# Patient Record
Sex: FEMALE | Race: ASIAN | Employment: UNEMPLOYED
[De-identification: names, ages, dates, MRNs, and addresses within clinical notes are randomized per-mention and may not be internally consistent; named-entity substitution may affect disease eponyms.]

---

## 2024-09-24 LAB
HEP B, EXTERNAL RESULT: NEGATIVE
HEPATITIS C ANTIBODY, EXTERNAL RESULT: NONREACTIVE
HIV, EXTERNAL RESULT: NONREACTIVE
RUBELLA TITER, EXTERNAL RESULT: NORMAL
T. PALLIDUM (SYPHILIS) ANTIBODY, EXTERNAL RESULT: NONREACTIVE

## 2024-09-26 LAB
C. TRACHOMATIS, EXTERNAL RESULT: NEGATIVE
N. GONORRHOEAE, EXTERNAL RESULT: NEGATIVE

## 2024-11-17 ENCOUNTER — APPOINTMENT (OUTPATIENT)
Facility: HOSPITAL | Age: 29
End: 2024-11-17
Payer: COMMERCIAL

## 2024-11-17 ENCOUNTER — HOSPITAL ENCOUNTER (EMERGENCY)
Facility: HOSPITAL | Age: 29
Discharge: HOME | End: 2024-11-17
Attending: STUDENT IN AN ORGANIZED HEALTH CARE EDUCATION/TRAINING PROGRAM
Payer: COMMERCIAL

## 2024-11-17 VITALS
RESPIRATION RATE: 20 BRPM | WEIGHT: 123.68 LBS | HEART RATE: 98 BPM | SYSTOLIC BLOOD PRESSURE: 120 MMHG | OXYGEN SATURATION: 97 % | BODY MASS INDEX: 24.28 KG/M2 | DIASTOLIC BLOOD PRESSURE: 86 MMHG | HEIGHT: 60 IN | TEMPERATURE: 97.6 F

## 2024-11-17 DIAGNOSIS — N93.9 VAGINAL BLEEDING: Primary | ICD-10-CM

## 2024-11-17 DIAGNOSIS — D72.829 LEUKOCYTOSIS, UNSPECIFIED TYPE: ICD-10-CM

## 2024-11-17 DIAGNOSIS — Z3A.14 14 WEEKS GESTATION OF PREGNANCY: ICD-10-CM

## 2024-11-17 LAB
ABDOMINAL CIRCUMFERENCE: 7.74 CM
ABDOMINAL CIRCUMFERENCE: 7.79 CM
ABDOMINAL CIRCUMFERENCE: 7.82 CM
ABDOMINAL CIRCUMFERENCE: 7.82 CM
ABO + RH BLD: NORMAL
ALBUMIN SERPL-MCNC: 3.2 G/DL (ref 3.5–5)
ALBUMIN/GLOB SERPL: 0.7 (ref 1.1–2.2)
ALP SERPL-CCNC: 101 U/L (ref 45–117)
ALT SERPL-CCNC: 51 U/L (ref 12–78)
ANION GAP SERPL CALC-SCNC: 3 MMOL/L (ref 2–12)
APPEARANCE UR: CLEAR
AST SERPL-CCNC: 24 U/L (ref 15–37)
BACTERIA URNS QL MICRO: NEGATIVE /HPF
BASOPHILS # BLD: 0 K/UL (ref 0–0.1)
BASOPHILS NFR BLD: 0 % (ref 0–1)
BILIRUB SERPL-MCNC: 0.2 MG/DL (ref 0.2–1)
BILIRUB UR QL: NEGATIVE
BIPARIETAL DIAMETER: 2.75 CM
BIPARIETAL DIAMETER: 2.81 CM
BIPARIETAL DIAMETER: 2.81 CM
BIPARIETAL DIAMETER: 2.84 CM
BIPARIETAL DIAMETER: 2.87 CM
BLOOD BANK CMNT PATIENT-IMP: NORMAL
BUN SERPL-MCNC: 6 MG/DL (ref 6–20)
BUN/CREAT SERPL: 12 (ref 12–20)
CALCIUM SERPL-MCNC: 9.3 MG/DL (ref 8.5–10.1)
CHLORIDE SERPL-SCNC: 110 MMOL/L (ref 97–108)
CO2 SERPL-SCNC: 24 MMOL/L (ref 21–32)
COLOR UR: ABNORMAL
COMMENT:: NORMAL
CREAT SERPL-MCNC: 0.51 MG/DL (ref 0.55–1.02)
CRL: 8.56 CM
CRL: 8.91 CM
CRL: 9.02 CM
CRL: 9.15 CM
DIFFERENTIAL METHOD BLD: ABNORMAL
EOSINOPHIL # BLD: 0 K/UL (ref 0–0.4)
EOSINOPHIL NFR BLD: 0 % (ref 0–7)
EPITH CASTS URNS QL MICRO: ABNORMAL /LPF
ERYTHROCYTE [DISTWIDTH] IN BLOOD BY AUTOMATED COUNT: 13.8 % (ref 11.5–14.5)
GLOBULIN SER CALC-MCNC: 4.7 G/DL (ref 2–4)
GLUCOSE SERPL-MCNC: 94 MG/DL (ref 65–100)
GLUCOSE UR STRIP.AUTO-MCNC: NEGATIVE MG/DL
HCG SERPL-ACNC: ABNORMAL MIU/ML (ref 0–6)
HCT VFR BLD AUTO: 35.7 % (ref 35–47)
HEAD CIRCUMFERENCE: 10.03 CM
HEAD CIRCUMFERENCE: 10.24 CM
HEAD CIRCUMFERENCE: 10.38 CM
HEAD CIRCUMFERENCE: 10.87 CM
HGB BLD-MCNC: 12.2 G/DL (ref 11.5–16)
HGB UR QL STRIP: ABNORMAL
HYALINE CASTS URNS QL MICRO: ABNORMAL /LPF (ref 0–5)
IMM GRANULOCYTES # BLD AUTO: 0 K/UL
IMM GRANULOCYTES NFR BLD AUTO: 0 %
KETONES UR QL STRIP.AUTO: NEGATIVE MG/DL
LEUKOCYTE ESTERASE UR QL STRIP.AUTO: NEGATIVE
LYMPHOCYTES # BLD: 2.8 K/UL (ref 0.8–3.5)
LYMPHOCYTES NFR BLD: 15 % (ref 12–49)
MCH RBC QN AUTO: 29.5 PG (ref 26–34)
MCHC RBC AUTO-ENTMCNC: 34.2 G/DL (ref 30–36.5)
MCV RBC AUTO: 86.2 FL (ref 80–99)
MONOCYTES # BLD: 1.5 K/UL (ref 0–1)
MONOCYTES NFR BLD: 8 % (ref 5–13)
NEUTS SEG # BLD: 14.3 K/UL (ref 1.8–8)
NEUTS SEG NFR BLD: 77 % (ref 32–75)
NITRITE UR QL STRIP.AUTO: NEGATIVE
NRBC # BLD: 0 K/UL (ref 0–0.01)
NRBC BLD-RTO: 0 PER 100 WBC
PH UR STRIP: 6.5 (ref 5–8)
PLATELET # BLD AUTO: 331 K/UL (ref 150–400)
PMV BLD AUTO: 10.5 FL (ref 8.9–12.9)
POTASSIUM SERPL-SCNC: 4 MMOL/L (ref 3.5–5.1)
PROT SERPL-MCNC: 7.9 G/DL (ref 6.4–8.2)
PROT UR STRIP-MCNC: NEGATIVE MG/DL
RBC # BLD AUTO: 4.14 M/UL (ref 3.8–5.2)
RBC #/AREA URNS HPF: ABNORMAL /HPF (ref 0–5)
RBC MORPH BLD: ABNORMAL
SODIUM SERPL-SCNC: 137 MMOL/L (ref 136–145)
SP GR UR REFRACTOMETRY: <1.005 (ref 1–1.03)
SPECIMEN HOLD: NORMAL
SPECIMEN HOLD: NORMAL
UROBILINOGEN UR QL STRIP.AUTO: 0.2 EU/DL (ref 0.2–1)
WBC # BLD AUTO: 18.6 K/UL (ref 3.6–11)
WBC URNS QL MICRO: ABNORMAL /HPF (ref 0–4)

## 2024-11-17 PROCEDURE — 85025 COMPLETE CBC W/AUTO DIFF WBC: CPT

## 2024-11-17 PROCEDURE — 80053 COMPREHEN METABOLIC PANEL: CPT

## 2024-11-17 PROCEDURE — 36415 COLL VENOUS BLD VENIPUNCTURE: CPT

## 2024-11-17 PROCEDURE — 81001 URINALYSIS AUTO W/SCOPE: CPT

## 2024-11-17 PROCEDURE — 99284 EMERGENCY DEPT VISIT MOD MDM: CPT

## 2024-11-17 PROCEDURE — 84702 CHORIONIC GONADOTROPIN TEST: CPT

## 2024-11-17 PROCEDURE — 76801 OB US < 14 WKS SINGLE FETUS: CPT

## 2024-11-17 PROCEDURE — 86901 BLOOD TYPING SEROLOGIC RH(D): CPT

## 2024-11-17 PROCEDURE — 86900 BLOOD TYPING SEROLOGIC ABO: CPT

## 2024-11-17 ASSESSMENT — PAIN - FUNCTIONAL ASSESSMENT: PAIN_FUNCTIONAL_ASSESSMENT: NONE - DENIES PAIN

## 2024-11-17 NOTE — ED TRIAGE NOTES
Patient arrives to ED reports vaginal bleeding upon waking up this morning.  Patient reports she is 14 weeks pregnant

## 2024-11-17 NOTE — ED NOTES
MD reviewed discharge instructions and options with patient and patient verbalized understanding. RN reviewed discharge instructions using teachback method. Pt ambulated to exit without difficulty and in no signs of acute distress escorted by male , and he  will drive home. No complaints or needs expressed at this time. Patient was counseled on medications prescribed at discharge. VSS, verbalized relief from most intense pain. Patient to call her OBGYN in the morning for appointment.

## 2024-11-17 NOTE — ED PROVIDER NOTES
CBC without evidence of anemia.  It does note leukocytosis, however discussed with Dr. Leiva, this can be expected in pregnancy.  Patient without any infectious symptoms.  Beta-HCG 52515.  Transvaginal ultrasound notes viable IUP at 14 weeks 4 days gestation.  On reassessment of the patient, she reports that her bleeding has resolved.  She went to the bathroom and had no further spotting.  Patient is stable for discharge home at this time.  Return precautions discussed with patient who expresses understanding and is in agreement with the current plan.  Recommend follow-up with her OB/GYN, as scheduled for tomorrow.    Amount and/or Complexity of Data Reviewed  Labs: ordered.  Radiology: ordered.          FINAL IMPRESSION      1. Vaginal bleeding    2. 14 weeks gestation of pregnancy    3. Leukocytosis, unspecified type          DISPOSITION/PLAN   DISPOSITION Decision To Discharge 11/17/2024 02:41:49 PM      PATIENT REFERRED TO:  Bishnu Jalloh MD  [address] [address] [phone]    In 2 days      [facility] EMERGENCY DEP  [address] [place] [id number] [phone]    As needed, If symptoms worsen    Your OBGYN - as scheduled tomorrow            DISCHARGE MEDICATIONS:  There are no discharge medications for this patient.        (Please note that portions of this note were completed with a voice recognition program.  Efforts were made to edit the dictations but occasionally words are mis-transcribed.)    Juarez Anderson PA-C (electronically signed)  Emergency Attending Physician / Physician Assistant / Nurse Practitioner             Juarez Anderson PA-C  11/17/24 3134

## 2025-04-24 LAB — GBS, EXTERNAL RESULT: NEGATIVE

## 2025-05-08 ENCOUNTER — HOSPITAL ENCOUNTER (INPATIENT)
Facility: HOSPITAL | Age: 30
LOS: 2 days | Discharge: HOME | End: 2025-05-10
Attending: OBSTETRICS & GYNECOLOGY | Admitting: OBSTETRICS & GYNECOLOGY
Payer: COMMERCIAL

## 2025-05-08 ENCOUNTER — ANESTHESIA (OUTPATIENT)
Facility: HOSPITAL | Age: 30
End: 2025-05-08
Payer: COMMERCIAL

## 2025-05-08 ENCOUNTER — ANESTHESIA EVENT (OUTPATIENT)
Facility: HOSPITAL | Age: 30
End: 2025-05-08
Payer: COMMERCIAL

## 2025-05-08 LAB
ABO + RH BLD: NORMAL
BLOOD GROUP ANTIBODIES SERPL: NORMAL
ERYTHROCYTE [DISTWIDTH] IN BLOOD BY AUTOMATED COUNT: 13.7 % (ref 11.5–14.5)
GLUCOSE BLD STRIP.AUTO-MCNC: 95 MG/DL (ref 65–117)
GLUCOSE SERPL-MCNC: 88 MG/DL (ref 65–100)
HCT VFR BLD AUTO: 42.7 % (ref 35–47)
HGB BLD-MCNC: 14.7 G/DL (ref 11.5–16)
MCH RBC QN AUTO: 29.9 PG (ref 26–34)
MCHC RBC AUTO-ENTMCNC: 34.4 G/DL (ref 30–36.5)
MCV RBC AUTO: 87 FL (ref 80–99)
NRBC # BLD: 0 K/UL (ref 0–0.01)
NRBC BLD-RTO: 0 PER 100 WBC
PLATELET # BLD AUTO: 209 K/UL (ref 150–400)
PMV BLD AUTO: 12.8 FL (ref 8.9–12.9)
RBC # BLD AUTO: 4.91 M/UL (ref 3.8–5.2)
RPR SER QL: NONREACTIVE
SERVICE CMNT-IMP: NORMAL
SPECIMEN EXP DATE BLD: NORMAL
WBC # BLD AUTO: 15.2 K/UL (ref 3.6–11)

## 2025-05-08 PROCEDURE — 4A1HXCZ MONITORING OF PRODUCTS OF CONCEPTION, CARDIAC RATE, EXTERNAL APPROACH: ICD-10-PCS | Performed by: STUDENT IN AN ORGANIZED HEALTH CARE EDUCATION/TRAINING PROGRAM

## 2025-05-08 PROCEDURE — 82947 ASSAY GLUCOSE BLOOD QUANT: CPT

## 2025-05-08 PROCEDURE — 86900 BLOOD TYPING SEROLOGIC ABO: CPT

## 2025-05-08 PROCEDURE — 85027 COMPLETE CBC AUTOMATED: CPT

## 2025-05-08 PROCEDURE — 6370000000 HC RX 637 (ALT 250 FOR IP): Performed by: STUDENT IN AN ORGANIZED HEALTH CARE EDUCATION/TRAINING PROGRAM

## 2025-05-08 PROCEDURE — 4500000002 HC ER NO CHARGE

## 2025-05-08 PROCEDURE — 86901 BLOOD TYPING SEROLOGIC RH(D): CPT

## 2025-05-08 PROCEDURE — 6360000002 HC RX W HCPCS: Performed by: STUDENT IN AN ORGANIZED HEALTH CARE EDUCATION/TRAINING PROGRAM

## 2025-05-08 PROCEDURE — 0KQM0ZZ REPAIR PERINEUM MUSCLE, OPEN APPROACH: ICD-10-PCS | Performed by: STUDENT IN AN ORGANIZED HEALTH CARE EDUCATION/TRAINING PROGRAM

## 2025-05-08 PROCEDURE — 99285 EMERGENCY DEPT VISIT HI MDM: CPT

## 2025-05-08 PROCEDURE — 2500000003 HC RX 250 WO HCPCS

## 2025-05-08 PROCEDURE — 00HU33Z INSERTION OF INFUSION DEVICE INTO SPINAL CANAL, PERCUTANEOUS APPROACH: ICD-10-PCS | Performed by: STUDENT IN AN ORGANIZED HEALTH CARE EDUCATION/TRAINING PROGRAM

## 2025-05-08 PROCEDURE — 2500000003 HC RX 250 WO HCPCS: Performed by: STUDENT IN AN ORGANIZED HEALTH CARE EDUCATION/TRAINING PROGRAM

## 2025-05-08 PROCEDURE — 51702 INSERT TEMP BLADDER CATH: CPT

## 2025-05-08 PROCEDURE — 82962 GLUCOSE BLOOD TEST: CPT

## 2025-05-08 PROCEDURE — 3700000025 EPIDURAL BLOCK: Performed by: STUDENT IN AN ORGANIZED HEALTH CARE EDUCATION/TRAINING PROGRAM

## 2025-05-08 PROCEDURE — 86592 SYPHILIS TEST NON-TREP QUAL: CPT

## 2025-05-08 PROCEDURE — 86850 RBC ANTIBODY SCREEN: CPT

## 2025-05-08 PROCEDURE — 6360000002 HC RX W HCPCS: Performed by: MIDWIFE

## 2025-05-08 PROCEDURE — 1120000000 HC RM PRIVATE OB

## 2025-05-08 PROCEDURE — 51701 INSERT BLADDER CATHETER: CPT

## 2025-05-08 PROCEDURE — 2580000003 HC RX 258: Performed by: MIDWIFE

## 2025-05-08 PROCEDURE — 2580000003 HC RX 258: Performed by: STUDENT IN AN ORGANIZED HEALTH CARE EDUCATION/TRAINING PROGRAM

## 2025-05-08 RX ORDER — ACETAMINOPHEN 500 MG
1000 TABLET ORAL EVERY 8 HOURS PRN
Status: DISCONTINUED | OUTPATIENT
Start: 2025-05-08 | End: 2025-05-09 | Stop reason: SDUPTHER

## 2025-05-08 RX ORDER — MISOPROSTOL 200 UG/1
800 TABLET ORAL PRN
Status: DISCONTINUED | OUTPATIENT
Start: 2025-05-08 | End: 2025-05-10 | Stop reason: HOSPADM

## 2025-05-08 RX ORDER — LIDOCAINE HYDROCHLORIDE AND EPINEPHRINE 20; 5 MG/ML; UG/ML
INJECTION, SOLUTION EPIDURAL; INFILTRATION; INTRACAUDAL; PERINEURAL
Status: DISCONTINUED | OUTPATIENT
Start: 2025-05-08 | End: 2025-05-08 | Stop reason: SDUPTHER

## 2025-05-08 RX ORDER — SODIUM CHLORIDE 0.9 % (FLUSH) 0.9 %
5-40 SYRINGE (ML) INJECTION PRN
Status: DISCONTINUED | OUTPATIENT
Start: 2025-05-08 | End: 2025-05-09 | Stop reason: SDUPTHER

## 2025-05-08 RX ORDER — METHYLERGONOVINE MALEATE 0.2 MG/ML
200 INJECTION INTRAVENOUS PRN
Status: DISCONTINUED | OUTPATIENT
Start: 2025-05-08 | End: 2025-05-10 | Stop reason: HOSPADM

## 2025-05-08 RX ORDER — ACETAMINOPHEN 500 MG
1000 TABLET ORAL EVERY 8 HOURS SCHEDULED
Status: DISCONTINUED | OUTPATIENT
Start: 2025-05-08 | End: 2025-05-10 | Stop reason: HOSPADM

## 2025-05-08 RX ORDER — NALOXONE HYDROCHLORIDE 0.4 MG/ML
INJECTION, SOLUTION INTRAMUSCULAR; INTRAVENOUS; SUBCUTANEOUS PRN
Status: DISCONTINUED | OUTPATIENT
Start: 2025-05-08 | End: 2025-05-10 | Stop reason: HOSPADM

## 2025-05-08 RX ORDER — MISOPROSTOL 200 UG/1
400 TABLET ORAL PRN
Status: DISCONTINUED | OUTPATIENT
Start: 2025-05-08 | End: 2025-05-09

## 2025-05-08 RX ORDER — SODIUM CHLORIDE 0.9 % (FLUSH) 0.9 %
5-40 SYRINGE (ML) INJECTION EVERY 12 HOURS SCHEDULED
Status: DISCONTINUED | OUTPATIENT
Start: 2025-05-08 | End: 2025-05-10 | Stop reason: HOSPADM

## 2025-05-08 RX ORDER — BUPIVACAINE HYDROCHLORIDE 2.5 MG/ML
INJECTION, SOLUTION EPIDURAL; INFILTRATION; INTRACAUDAL; PERINEURAL
Status: COMPLETED
Start: 2025-05-08 | End: 2025-05-08

## 2025-05-08 RX ORDER — NALBUPHINE HYDROCHLORIDE 10 MG/ML
5 INJECTION INTRAMUSCULAR; INTRAVENOUS; SUBCUTANEOUS EVERY 4 HOURS PRN
Status: DISCONTINUED | OUTPATIENT
Start: 2025-05-08 | End: 2025-05-10 | Stop reason: HOSPADM

## 2025-05-08 RX ORDER — LIDOCAINE HYDROCHLORIDE AND EPINEPHRINE 20; 5 MG/ML; UG/ML
INJECTION, SOLUTION EPIDURAL; INFILTRATION; INTRACAUDAL; PERINEURAL
Status: COMPLETED
Start: 2025-05-08 | End: 2025-05-08

## 2025-05-08 RX ORDER — BUPIVACAINE HYDROCHLORIDE 2.5 MG/ML
INJECTION, SOLUTION EPIDURAL; INFILTRATION; INTRACAUDAL; PERINEURAL
Status: DISCONTINUED | OUTPATIENT
Start: 2025-05-08 | End: 2025-05-08 | Stop reason: SDUPTHER

## 2025-05-08 RX ORDER — SODIUM CHLORIDE 9 MG/ML
25 INJECTION, SOLUTION INTRAVENOUS PRN
Status: DISCONTINUED | OUTPATIENT
Start: 2025-05-08 | End: 2025-05-09 | Stop reason: SDUPTHER

## 2025-05-08 RX ORDER — SODIUM CHLORIDE, SODIUM LACTATE, POTASSIUM CHLORIDE, CALCIUM CHLORIDE 600; 310; 30; 20 MG/100ML; MG/100ML; MG/100ML; MG/100ML
INJECTION, SOLUTION INTRAVENOUS CONTINUOUS
Status: DISCONTINUED | OUTPATIENT
Start: 2025-05-08 | End: 2025-05-10 | Stop reason: HOSPADM

## 2025-05-08 RX ORDER — FENTANYL/BUPIVACAINE/NS/PF 2-1250MCG
1-15 PLASTIC BAG, INJECTION (ML) INJECTION CONTINUOUS
Refills: 0 | Status: DISCONTINUED | OUTPATIENT
Start: 2025-05-08 | End: 2025-05-10 | Stop reason: HOSPADM

## 2025-05-08 RX ORDER — MISOPROSTOL 200 UG/1
200 TABLET ORAL PRN
Status: DISCONTINUED | OUTPATIENT
Start: 2025-05-08 | End: 2025-05-10 | Stop reason: HOSPADM

## 2025-05-08 RX ORDER — BISACODYL 10 MG
10 SUPPOSITORY, RECTAL RECTAL DAILY PRN
Status: DISCONTINUED | OUTPATIENT
Start: 2025-05-08 | End: 2025-05-10 | Stop reason: HOSPADM

## 2025-05-08 RX ORDER — SODIUM CHLORIDE, SODIUM LACTATE, POTASSIUM CHLORIDE, AND CALCIUM CHLORIDE .6; .31; .03; .02 G/100ML; G/100ML; G/100ML; G/100ML
500 INJECTION, SOLUTION INTRAVENOUS PRN
OUTPATIENT
Start: 2025-05-08

## 2025-05-08 RX ORDER — FENTANYL CITRATE 50 UG/ML
100 INJECTION, SOLUTION INTRAMUSCULAR; INTRAVENOUS ONCE
Status: DISCONTINUED | OUTPATIENT
Start: 2025-05-08 | End: 2025-05-09 | Stop reason: SDUPTHER

## 2025-05-08 RX ORDER — MODIFIED LANOLIN
OINTMENT (GRAM) TOPICAL PRN
Status: DISCONTINUED | OUTPATIENT
Start: 2025-05-08 | End: 2025-05-10 | Stop reason: HOSPADM

## 2025-05-08 RX ORDER — ONDANSETRON 2 MG/ML
4 INJECTION INTRAMUSCULAR; INTRAVENOUS EVERY 6 HOURS PRN
Status: DISCONTINUED | OUTPATIENT
Start: 2025-05-08 | End: 2025-05-08 | Stop reason: SDUPTHER

## 2025-05-08 RX ORDER — NALBUPHINE HYDROCHLORIDE 10 MG/ML
10 INJECTION INTRAMUSCULAR; INTRAVENOUS; SUBCUTANEOUS
Status: DISCONTINUED | OUTPATIENT
Start: 2025-05-08 | End: 2025-05-10 | Stop reason: HOSPADM

## 2025-05-08 RX ORDER — ONDANSETRON 2 MG/ML
4 INJECTION INTRAMUSCULAR; INTRAVENOUS EVERY 6 HOURS PRN
Status: DISCONTINUED | OUTPATIENT
Start: 2025-05-08 | End: 2025-05-10 | Stop reason: HOSPADM

## 2025-05-08 RX ORDER — ONDANSETRON 4 MG/1
4 TABLET, ORALLY DISINTEGRATING ORAL EVERY 6 HOURS PRN
Status: DISCONTINUED | OUTPATIENT
Start: 2025-05-08 | End: 2025-05-10 | Stop reason: HOSPADM

## 2025-05-08 RX ORDER — TERBUTALINE SULFATE 1 MG/ML
0.25 INJECTION SUBCUTANEOUS
Status: DISCONTINUED | OUTPATIENT
Start: 2025-05-08 | End: 2025-05-09

## 2025-05-08 RX ORDER — CARBOPROST TROMETHAMINE 250 UG/ML
250 INJECTION, SOLUTION INTRAMUSCULAR PRN
Status: DISCONTINUED | OUTPATIENT
Start: 2025-05-08 | End: 2025-05-09

## 2025-05-08 RX ORDER — SODIUM CHLORIDE 0.9 % (FLUSH) 0.9 %
5-40 SYRINGE (ML) INJECTION EVERY 12 HOURS SCHEDULED
Status: DISCONTINUED | OUTPATIENT
Start: 2025-05-08 | End: 2025-05-09 | Stop reason: SDUPTHER

## 2025-05-08 RX ORDER — SIMETHICONE 80 MG
80 TABLET,CHEWABLE ORAL EVERY 6 HOURS PRN
Status: DISCONTINUED | OUTPATIENT
Start: 2025-05-08 | End: 2025-05-10 | Stop reason: HOSPADM

## 2025-05-08 RX ORDER — SEVOFLURANE 250 ML/250ML
1 LIQUID RESPIRATORY (INHALATION) CONTINUOUS PRN
Status: DISCONTINUED | OUTPATIENT
Start: 2025-05-08 | End: 2025-05-09

## 2025-05-08 RX ORDER — METHYLERGONOVINE MALEATE 0.2 MG/ML
200 INJECTION INTRAVENOUS PRN
Status: DISCONTINUED | OUTPATIENT
Start: 2025-05-08 | End: 2025-05-08 | Stop reason: SDUPTHER

## 2025-05-08 RX ORDER — LIDOCAINE HYDROCHLORIDE 10 MG/ML
30 INJECTION, SOLUTION EPIDURAL; INFILTRATION; INTRACAUDAL; PERINEURAL PRN
Status: DISCONTINUED | OUTPATIENT
Start: 2025-05-08 | End: 2025-05-09

## 2025-05-08 RX ORDER — SODIUM CHLORIDE, SODIUM LACTATE, POTASSIUM CHLORIDE, CALCIUM CHLORIDE 600; 310; 30; 20 MG/100ML; MG/100ML; MG/100ML; MG/100ML
INJECTION, SOLUTION INTRAVENOUS CONTINUOUS
Status: DISCONTINUED | OUTPATIENT
Start: 2025-05-08 | End: 2025-05-09 | Stop reason: SDUPTHER

## 2025-05-08 RX ORDER — SODIUM CHLORIDE 9 MG/ML
INJECTION, SOLUTION INTRAVENOUS PRN
Status: DISCONTINUED | OUTPATIENT
Start: 2025-05-08 | End: 2025-05-10 | Stop reason: HOSPADM

## 2025-05-08 RX ORDER — CLINDAMYCIN PHOSPHATE 10 MG/G
GEL TOPICAL 2 TIMES DAILY
Status: ON HOLD | COMMUNITY
End: 2025-05-10 | Stop reason: HOSPADM

## 2025-05-08 RX ORDER — IBUPROFEN 400 MG/1
800 TABLET, FILM COATED ORAL EVERY 8 HOURS SCHEDULED
Status: DISCONTINUED | OUTPATIENT
Start: 2025-05-08 | End: 2025-05-10 | Stop reason: HOSPADM

## 2025-05-08 RX ORDER — EPHEDRINE SULFATE 50 MG/ML
10 INJECTION INTRAVENOUS
Status: COMPLETED | OUTPATIENT
Start: 2025-05-08 | End: 2025-05-08

## 2025-05-08 RX ORDER — ONDANSETRON 4 MG/1
4 TABLET, ORALLY DISINTEGRATING ORAL EVERY 6 HOURS PRN
Status: DISCONTINUED | OUTPATIENT
Start: 2025-05-08 | End: 2025-05-08 | Stop reason: SDUPTHER

## 2025-05-08 RX ORDER — SODIUM CHLORIDE 0.9 % (FLUSH) 0.9 %
5-40 SYRINGE (ML) INJECTION PRN
Status: DISCONTINUED | OUTPATIENT
Start: 2025-05-08 | End: 2025-05-10 | Stop reason: HOSPADM

## 2025-05-08 RX ORDER — EPHEDRINE SULFATE 50 MG/ML
INJECTION INTRAVENOUS
Status: COMPLETED
Start: 2025-05-08 | End: 2025-05-08

## 2025-05-08 RX ADMIN — ACETAMINOPHEN 1000 MG: 500 TABLET ORAL at 22:07

## 2025-05-08 RX ADMIN — BUPIVACAINE HYDROCHLORIDE 4 ML: 2.5 INJECTION, SOLUTION EPIDURAL; INFILTRATION; INTRACAUDAL; PERINEURAL at 14:55

## 2025-05-08 RX ADMIN — NALBUPHINE HYDROCHLORIDE 10 MG: 10 INJECTION, SOLUTION INTRAMUSCULAR; INTRAVENOUS; SUBCUTANEOUS at 05:14

## 2025-05-08 RX ADMIN — SODIUM CHLORIDE, SODIUM LACTATE, POTASSIUM CHLORIDE, AND CALCIUM CHLORIDE: .6; .31; .03; .02 INJECTION, SOLUTION INTRAVENOUS at 12:38

## 2025-05-08 RX ADMIN — SODIUM CHLORIDE, SODIUM LACTATE, POTASSIUM CHLORIDE, AND CALCIUM CHLORIDE: .6; .31; .03; .02 INJECTION, SOLUTION INTRAVENOUS at 05:08

## 2025-05-08 RX ADMIN — LIDOCAINE HYDROCHLORIDE,EPINEPHRINE BITARTRATE 3 ML: 20; .005 INJECTION, SOLUTION EPIDURAL; INFILTRATION; INTRACAUDAL; PERINEURAL at 14:50

## 2025-05-08 RX ADMIN — EPHEDRINE SULFATE 10 MG: 50 INJECTION INTRAVENOUS at 17:09

## 2025-05-08 RX ADMIN — OXYTOCIN 2 MILLI-UNITS/MIN: 10 INJECTION, SOLUTION INTRAMUSCULAR; INTRAVENOUS at 12:39

## 2025-05-08 RX ADMIN — Medication 10 ML/HR: at 15:19

## 2025-05-08 RX ADMIN — IBUPROFEN 800 MG: 400 TABLET, FILM COATED ORAL at 22:13

## 2025-05-08 NOTE — PROGRESS NOTES
1930 Report received from KADY Vasquez Rn    1943 Dr Duong at bedside to push with pt.  1945 serrano catheter out     2046 Spontaneous vaginal delivery of baby girl with spontaneous respirations by Dr Duong     2240 Sponge bath completed gown changed.   2320 TRANSFER - OUT REPORT:    Verbal report given to Yasmine Alvarez on Josselin Santizo  being transferred to MIU for routine progression of patient care       Report consisted of patient's Situation, Background, Assessment and   Recommendations(SBAR).     Information from the following report(s) Nurse Handoff Report, Adult Overview, Intake/Output, MAR, Recent Results, and Med Rec Status was reviewed with the receiving nurse.           Lines:   Peripheral IV 05/08/25 Right Forearm (Active)   Site Assessment Clean, dry & intact 05/08/25 0740   Line Status Normal saline locked 05/08/25 0740   Phlebitis Assessment No symptoms 05/08/25 0740   Infiltration Assessment 0 05/08/25 0740   Alcohol Cap Used Yes 05/08/25 0740   Dressing Status Clean, dry & intact 05/08/25 0740   Dressing Type Transparent 05/08/25 0740        Opportunity for questions and clarification was provided.      Patient transported with:  Registered Nurse transferred via wheel chair in stable condition with belongings and baby transferred with mom in stable condition.

## 2025-05-08 NOTE — ANESTHESIA PRE PROCEDURE
Department of Anesthesiology  Preprocedure Note       Name:  Josselin Santizo   Age:  29 y.o.  :  1995                                          MRN:  [id number]         Date:  2025      Surgeon: * Surgery not found *    Procedure:     Medications prior to admission:   Prior to Admission medications    Medication Sig Start Date End Date Taking? Authorizing Provider   Prenatal MV-Min-Fe Fum-FA-DHA (PRENATAL 1 PO) Take by mouth   Yes ProviderMatt MD   clindamycin 1 % gel Apply topically in the morning and at bedtime Apply topically.   Yes Provider, MD Matt       Current medications:    Current Facility-Administered Medications   Medication Dose Route Frequency Provider Last Rate Last Admin   • terbutaline (BRETHINE) injection 0.25 mg  0.25 mg SubCUTAneous Once PRN Juliana Brown APRN - ERIC       • lactated ringers infusion   IntraVENous Continuous Juliana Brown APRN -  mL/hr at 25 1323 Rate Verify at 25 1323   • sodium chloride flush 0.9 % injection 5-40 mL  5-40 mL IntraVENous 2 times per day Juliana Brown APRN - CNMAURICIO       • sodium chloride flush 0.9 % injection 5-40 mL  5-40 mL IntraVENous PRN Juliana Brown APRN - CNMAURICIO       • 0.9 % sodium chloride infusion  25 mL IntraVENous PRN Juliana Brown APRN - CNM       • ondansetron (ZOFRAN) injection 4 mg  4 mg IntraVENous Q6H PRN Juliana Brown APRN - ERIC        Or   • ondansetron (ZOFRAN-ODT) disintegrating tablet 4 mg  4 mg Oral Q6H PRN Juliana Brown APRN - CNM       • oxytocin (PITOCIN) 30 units in 500 mL infusion  87.3 glenna-units/min IntraVENous Continuous PRN Juliana Brown APRN - CNMAURICIO        And   • oxytocin (PITOCIN) 10 unit bolus from the bag  10 Units IntraVENous PRN Juliana Brown APRN - CNM       • methylergonovine (METHERGINE) injection 200 mcg  200 mcg IntraMUSCular PRN Juliana Brown APRN - CNMAURICIO       • carboprost (HEMABATE) injection 250 mcg  250 mcg IntraMUSCular PRN Kevin

## 2025-05-08 NOTE — PROGRESS NOTES
1212~  Dr Duong in [place] [facility] 5/70[place]2.  Suggests pitocin for augmentation.  Patient agrees.

## 2025-05-08 NOTE — PROGRESS NOTES
Labor Progress Note    S: Patient resting comfortably with epidural in place.      O:   Vitals:    25 1628   BP: 103/67   Pulse: 96   Resp:    Temp:    SpO2:       FHT: 140 / moderate / + accels / no decels     [place]: q 2-3 minutes      SVE: c/c/+1     30 yo  at 38w5d admitted for labor     Great progress since last check!   Feeling a little intermittent pressure   Will set up delivery table and then start pushing     Carlene Duong MD

## 2025-05-08 NOTE — ANESTHESIA PROCEDURE NOTES
Epidural Block    Patient location during procedure: OB  Start time: 5/8/2025 2:18 PM  End time: 5/8/2025 2:55 PM  Reason for block: labor epidural  Staffing  Performed: anesthesiologist   Anesthesiologist: Shae Foley DO  Performed by: Shae Foley DO  Authorized by: Shae Foley DO    Epidural  Patient position: sitting  Prep: DuraPrep  Patient monitoring: cardiac monitor, continuous pulse ox and frequent blood pressure checks  Approach: midline  Location: L3-4  Injection technique: TEREZA saline  Provider prep: mask and sterile gloves  Needle  Needle type: Tuohy   Needle gauge: 17 G  Needle length: 3.5 in  Needle insertion depth: 4.5 cm  Catheter type: end hole  Catheter size: 18 G  Catheter at skin depth: 10 cmCatheter Secured: tegaderm and tape  Assessment  Sensory level: T6  Events: None  Hemodynamics: stable  Attempts: 2  Outcomes: uncomplicated and patient tolerated procedure well  Preanesthetic Checklist  Completed: patient identified, IV checked, site marked, risks and benefits discussed, surgical/procedural consents, equipment checked, pre-op evaluation, timeout performed, anesthesia consent given, oxygen available, monitors applied/VS acknowledged and fire risk safety assessment completed and verbalized

## 2025-05-08 NOTE — H&P
Labor and Delivery History & Physical  2025    Patient is a 29 y.o.  @ 38w5d with EDC of 2025 admitted from [place] [address] with grossly ruptured membranes and in active labor. She is a patient of [facility] and has been following with them throughout  course. Pregnancy complicated by GDM. Has been diet controlled.     PNC: Blood type: O            RH: pos            Hep B: NEG            Rubella: immune            GBS status: NEG            HIV: NEG            RPR/TPA/VDRL: NR            GC/CT: NEG            HSV serology: not noted on prenatal records, but patient denies any hx/sxs    OBHX:   OB History          1    Para        Term                AB        Living             SAB        IAB        Ectopic        Molar        Multiple        Live Births                    PMH: No past medical history on file.    PSH: No past surgical history on file.    Medications:   Prior to Admission Medications   Prescriptions Last Dose Informant Patient Reported? Taking?   Prenatal MV-Min-Fe Fum-FA-DHA (PRENATAL 1 PO) 2025  Yes Yes   Sig: Take by mouth   clindamycin 1 % gel 2025  Yes Yes   Sig: Apply topically in the morning and at bedtime Apply topically.      Facility-Administered Medications: None       Allergies: No Known Allergies    Pertinent ROS: Review of Systems - Negative except for pregnancy, contractions, leaking fluid     Family Hx: No family history on file.    Social Hs:   Social History     Socioeconomic History    Marital status:      Spouse name: Not on file    Number of children: Not on file    Years of education: Not on file    Highest education level: Not on file   Occupational History    Not on file   Tobacco Use    Smoking status: Not on file    Smokeless tobacco: Not on file   Substance and Sexual Activity    Alcohol use: Not on file    Drug use: Not on file    Sexual activity: Not on file   Other Topics Concern    Not on file   Social History Narrative    Not on

## 2025-05-08 NOTE — ED NOTES
HPI Patient is a 29 y.o.  @ 38w5d with aletha of 25 who presents to the [facility] at 0312 reporting a gush of fluid at 0240 this am. She feels like contractions began intermittently yesterday as well. They have become stronger and closer since then. Today, she reports good fetal movement and denies vaginal bleeding, nausea/vomiting/diarrhea, fever, cough, or sore throat.  She reports regular prenatal care with [facility] and denies any reports GDM is her only significant hx. She controls this with diet. Reports blood sugars in 80s and 90s fasting and normal post meal levels       Chief Complaint   Patient presents with    Rupture of Membranes    Contractions       No past medical history on file.    No past surgical history on file.      No family history on file.    Social History     Socioeconomic History    Marital status:      Spouse name: Not on file    Number of children: Not on file    Years of education: Not on file    Highest education level: Not on file   Occupational History    Not on file   Tobacco Use    Smoking status: Not on file    Smokeless tobacco: Not on file   Substance and Sexual Activity    Alcohol use: Not on file    Drug use: Not on file    Sexual activity: Not on file   Other Topics Concern    Not on file   Social History Narrative    Not on file     Social Drivers of Health     Financial Resource Strain: Not on file   Food Insecurity: No Food Insecurity (2025)    Hunger Vital Sign     Worried About Running Out of Food in the Last Year: Never true     Ran Out of Food in the Last Year: Never true   Transportation Needs: No Transportation Needs (2025)    PRAPARE - Transportation     Lack of Transportation (Medical): No     Lack of Transportation (Non-Medical): No   Physical Activity: Not on file   Stress: Not on file   Social Connections: Not on file   Intimate Partner Violence: Not on file   Housing Stability: Low Risk  (2025)    Housing Stability Vital Sign     Unable to Pay for

## 2025-05-08 NOTE — PROGRESS NOTES
Labor Progress Note    S: Patient resting comfortably, not feeling contractions very painfully right now.      O:   Vitals:    25 0740   BP: 126/73   Pulse: 86   Resp: 16   Temp: 98.1 °F (36.7 °C)   SpO2:       FHT: 140 / mod / + accels / no decels     [place]: q 4-6 minutes      SVE: 5/70/-2     28 yo  at 38w5d admitted for labor, SROM.     Discussed pitocin for augmentation as ctx have spaced, patient in agreement   Pain mgmt per patient request   GBS neg     Carlene Duong MD

## 2025-05-08 NOTE — PROGRESS NOTES
0420 Pt transferred from [facility]EIGN with ruptured membranes service of Dr Covington with ROSEMARY Brown CNM on call. Oriented to room. Report received from DARRION Turcios RN    5238 Bedside shift change report given to ROSLYN Vasquez Rn (oncoming nurse) by GAGAN Myers Rn (offgoing nurse). Report included the following information Nurse Handoff Report, Adult Overview, MAR, Recent Results, and Med Rec Status.

## 2025-05-08 NOTE — PROGRESS NOTES
0730: SBAR report received from GAGAN Myers RN. Patient resting quietly in bed. IV pain meds still helping, patient able to rest through contractions   0802: Dr. Duong at bedside   0845: Novii monitor placed on patient   0957: Patient sitting up eating breakfast.   1020: Patient sitting on birthing ball   1212: Dr. Duong at bedside with Alvarez RN to assess cervix. 5/70/-2. Plan to start pitocin to augment labor  1239: Pitocin started. Patient resting in exaggerated runners with peanut ball   1355: Patient in the restroom sitting on the toilet, feeling some rectal pressure with contractions   1405: Patient requesting epidural at this time. Standing and swaying at the side of the bed   1413: Anesthesia called for epidural placement   1424: Dr. Foley at bedside for epidural placement. No questions from patient at this time  1454: Epidural placed. Patient resting on L side. Procedure tolerated well   1523: Patient turned to R side, flying cowgirl with peanut ball   1623: Patient turned to L side, flying cowgirl with peanut ball   1709: Ephedrine given  1716: Henriquez cath placed   1720: Patient turned to R side exaggerated runners peanut ball   1801: Dr. Duong at bedside. SVE c/c/+1.  1811: Patient in knee chest   1843: Dr. Duong at bedside. Pushing.   1853: Closed knee pushing. RN and Dr. Duong at bedside continuously monitoring FHR   1905: Happy baby pushing. RN at bedside continuously monitoring FHR  1916: Patient requesting a break to visit with family for a few minutes.   1930: SBAR report given to GAGAN Myers who is assuming care of patient at this time   1943: Henriquez removed

## 2025-05-08 NOTE — ED TRIAGE NOTES
0312: Patient of [facility] (, 38w5d) arrives ambulatory to [facility] 3 with complaints of contractions that started yesterday afternoon that have steadily increased in frequency and intensity. Patient reports a large gush of clear fluid at 0240 this morning. Patient positive for fetal movement. Negative for vaginal bleeding, visual disturbances, RUQ pain, or headaches at this time. Pregnancy complicated by diet controlled gestational diabetes. VSS. Patient oriented to room and call bell wtihin reach.     0315: Nitrazine positive. Patient grossly ruptured.    0320: ROSEMARY Brown CNM called to notify of patient arrival/status.    0330: ROSEMARY Brown CNM at bedside to assess/evaluate patient. SVE: /-1. Plan to admit to L&D for labor.    0405: Patient removed from EFM following reactive NST.    0415: IV started in L wrist. Labs drawn.     0420: SBAR given to GIORGIO Myers RN.

## 2025-05-08 NOTE — PROGRESS NOTES
Delayed entry d/t patient care     Labor Progress Note    S: Patient resting, feeling contractions a little more painfully than earlier but comfortable in between and coping well.     O:   Vitals:    25 0740   BP: 126/73   Pulse: 86   Resp: 16   Temp: 98.1 °F (36.7 °C)   SpO2:       FHT: 140 / moderate / + accels / no decels     [employer]: q 4-6 minutes      SVE: deferred     28 yo  at 38w5d admitted for SROM with labor. Pregnancy complicated by GDMA1.     GBS neg   Pain mgmt per patient request   Cont exp mgmt for now   BG within range     Carlene Duong MD

## 2025-05-09 PROCEDURE — 6370000000 HC RX 637 (ALT 250 FOR IP): Performed by: OBSTETRICS & GYNECOLOGY

## 2025-05-09 PROCEDURE — 1120000000 HC RM PRIVATE OB

## 2025-05-09 PROCEDURE — 6370000000 HC RX 637 (ALT 250 FOR IP): Performed by: STUDENT IN AN ORGANIZED HEALTH CARE EDUCATION/TRAINING PROGRAM

## 2025-05-09 RX ORDER — IBUPROFEN 800 MG/1
800 TABLET, FILM COATED ORAL EVERY 8 HOURS SCHEDULED
Qty: 30 TABLET | Refills: 1 | Status: CANCELLED | OUTPATIENT
Start: 2025-05-09

## 2025-05-09 RX ORDER — OXYCODONE HYDROCHLORIDE 5 MG/1
5 TABLET ORAL EVERY 6 HOURS PRN
Qty: 5 TABLET | Refills: 0 | Status: CANCELLED | OUTPATIENT
Start: 2025-05-09 | End: 2025-05-12

## 2025-05-09 RX ORDER — DOCUSATE SODIUM 100 MG/1
100 CAPSULE, LIQUID FILLED ORAL 2 TIMES DAILY
Status: DISCONTINUED | OUTPATIENT
Start: 2025-05-10 | End: 2025-05-10 | Stop reason: HOSPADM

## 2025-05-09 RX ORDER — OXYCODONE HYDROCHLORIDE 5 MG/1
5 TABLET ORAL EVERY 4 HOURS PRN
Refills: 0 | Status: DISCONTINUED | OUTPATIENT
Start: 2025-05-09 | End: 2025-05-10 | Stop reason: HOSPADM

## 2025-05-09 RX ADMIN — OXYCODONE HYDROCHLORIDE 5 MG: 5 TABLET ORAL at 21:51

## 2025-05-09 RX ADMIN — OXYCODONE HYDROCHLORIDE 5 MG: 5 TABLET ORAL at 16:43

## 2025-05-09 RX ADMIN — ACETAMINOPHEN 1000 MG: 500 TABLET ORAL at 21:51

## 2025-05-09 RX ADMIN — ACETAMINOPHEN 1000 MG: 500 TABLET ORAL at 14:12

## 2025-05-09 RX ADMIN — MAGNESIUM HYDROXIDE 30 ML: 400 SUSPENSION ORAL at 10:00

## 2025-05-09 RX ADMIN — IBUPROFEN 800 MG: 400 TABLET, FILM COATED ORAL at 04:58

## 2025-05-09 RX ADMIN — ACETAMINOPHEN 1000 MG: 500 TABLET ORAL at 04:58

## 2025-05-09 RX ADMIN — IBUPROFEN 800 MG: 400 TABLET, FILM COATED ORAL at 14:12

## 2025-05-09 RX ADMIN — IBUPROFEN 800 MG: 400 TABLET, FILM COATED ORAL at 21:51

## 2025-05-09 RX ADMIN — OXYCODONE HYDROCHLORIDE 5 MG: 5 TABLET ORAL at 10:00

## 2025-05-09 ASSESSMENT — PAIN DESCRIPTION - LOCATION
LOCATION: ABDOMEN;INCISION
LOCATION: PERINEUM;BACK
LOCATION: ABDOMEN;PERINEUM

## 2025-05-09 ASSESSMENT — PAIN SCALES - GENERAL
PAINLEVEL_OUTOF10: 7
PAINLEVEL_OUTOF10: 8
PAINLEVEL_OUTOF10: 8

## 2025-05-09 ASSESSMENT — PAIN DESCRIPTION - ORIENTATION
ORIENTATION: LOWER
ORIENTATION: LOWER;MID

## 2025-05-09 ASSESSMENT — PAIN - FUNCTIONAL ASSESSMENT: PAIN_FUNCTIONAL_ASSESSMENT: ACTIVITIES ARE NOT PREVENTED

## 2025-05-09 ASSESSMENT — PAIN DESCRIPTION - DESCRIPTORS: DESCRIPTORS: SORE;BURNING;DISCOMFORT

## 2025-05-09 NOTE — PROGRESS NOTES
Bedside shift change report given to PAUL Singh RN (oncoming nurse) by TRINY Lara RN (offgoing nurse). Report included the following information Nurse Handoff Report.

## 2025-05-09 NOTE — L&D DELIVERY NOTE
Rita Santizo [[id number]]      Labor Events     Labor: No   Steroids: None  Cervical Ripening Date/Time:      Antibiotics Received during Labor: No  Rupture Date/Time:  25 02:40:00   Rupture Type: SROM  Fluid Color: Clear  Fluid Odor: None  Fluid Volume: Moderate  Induction: None  Augmentation: Oxytocin  Labor Complications: None       Anesthesia    Method: Epidural       Labor Event Times      Labor onset date/time:  25 05:30:00     Dilation complete date/time:  25 18:01:00     Start pushing date/time:  2025 18:43:47   Decision date/time (emergent ):            Delivery Details      Delivery Date: 25 Delivery Time: 20:46:00   Delivery Type: Vaginal, Spontaneous               Presentation    Presentation: Vertex  _: Occiput  _: Anterior       Shoulder Dystocia    Shoulder Dystocia Present?: No       Assisted Delivery Details    Forceps Attempted?: No  Vacuum Extractor Attempted?: No                           Cord    Vessels: 3 Vessels  Complications: None  Delayed Cord Clamping?: Yes  Cord Clamped Date/Time: 2025 20:49:00  Cord Blood Disposition: Lab  Gases Sent?: No              Placenta    Date/Time: 2025 20:48:00  Removal: Spontaneous  Appearance: Intact  Disposition: Discarded       Lacerations    Episiotomy: None  Perineal Lacerations: 2nd  Other Lacerations: no non-perineal laceration       Vaginal Counts    Initial Count Personnel: ROSLYN HOLLAND RN  Initial Count Verified By: DARRION VIERA RN  Intial Sponge Count: Correct  Intial Instruments Count: Correct   Final Sponges Count: Correct Final Needles  Count: Correct Final Instruments Count: Correct   Final Count Personnel: DR HOLT  Final Count Verified By: GAGAN GIL RN  Accurate Final Count?: Yes       Blood Loss  Mother: Josselin Santizo #[id number]     Start of Mother's Information      Delivery Blood Loss   Intrapartum & Postpartum: 25 0530 - 25    Delivery Admission:

## 2025-05-09 NOTE — ANESTHESIA POSTPROCEDURE EVALUATION
Department of Anesthesiology  Postprocedure Note    Patient: Josselin Santizo  MRN: [id number]  YOB: 1995  Date of evaluation: 5/8/2025    Procedure Summary       Date: 05/08/25 Room / Location:     Anesthesia Start: 1418 Anesthesia Stop: 2046    Procedure: Labor Analgesia Diagnosis:     Scheduled Providers:  Responsible Provider: Lei Fitzgerald DO    Anesthesia Type: epidural ASA Status: 2            Anesthesia Type: No value filed.    Agata Phase I:      Agata Phase II:      Anesthesia Post Evaluation    Patient location during evaluation: bedside  Patient participation: complete - patient participated  Level of consciousness: awake  Pain score: 0  Airway patency: patent  Nausea & Vomiting: no nausea and no vomiting  Cardiovascular status: blood pressure returned to baseline  Respiratory status: acceptable  Hydration status: euvolemic  Comments: /60   Pulse (!) 103   Temp 98.4 °F (36.9 °C) (Oral)   Resp 16   SpO2 100%     Pain management: adequate    No notable events documented.

## 2025-05-09 NOTE — PROGRESS NOTES
How Severe Are They?: mild Post-Partum Day Number 1 Progress Note    Josselin Santizo     Assessment: Doing well, post partum day 1    Plan:  - Continue routine postpartum and perineal care as well as maternal education.  - Plan discharge home tomorrow.    Information for the patient's :  Rita Santizo [[id number]]   Vaginal, Spontaneous Patient doing well without significant complaint.  Voiding without difficulty, normal lochia.    Vitals:  /81   Pulse 94   Temp 98 °F (36.7 °C) (Oral)   Resp 16   SpO2 98%   Temp (24hrs), Av.1 °F (36.7 °C), Min:97.5 °F (36.4 °C), Max:98.8 °F (37.1 °C)        Exam:   Patient without distress.                  Fundus firm, nontender per nursing fundal checks.                Perineum with normal lochia noted per nursing assessment.                Lower extremities are negative for pathological edema.    Labs:     Lab Results   Component Value Date/Time    WBC 15.2 2025 04:43 AM    WBC 18.6 2024 12:24 PM    HGB 14.7 2025 04:43 AM    HGB 12.2 2024 12:24 PM    HCT 42.7 2025 04:43 AM    HCT 35.7 2024 12:24 PM     2025 04:43 AM     2024 12:24 PM       Recent Results (from the past 24 hours)   POCT Glucose    Collection Time: 25  6:32 PM   Result Value Ref Range    POC Glucose 95 65 - 117 mg/dL    Performed by: AMALIA Ramos RN                   [d   Is This A New Presentation, Or A Follow-Up?: Skin Lesions

## 2025-05-09 NOTE — LACTATION NOTE
This note was copied from a baby's chart.  Infant born vaginally to a  mom at 38 6/7 weeks gestation. Mom noted breast changes during the pregnancy. Assisted mom with positioning infant at the breast in the football position. Infant latched with rhythmic sucking, requiring relatching several times. Helped mom with aligning infant at the breast, using pillows for support.     Feeding Plan:  Mother will keep baby skin to skin as often as possible, feed on demand, 8-12x/day , respond to feeding cues, obtain latch, listen for audible swallowing, be aware of signs of oxytocin release/ cramping,thirst,sleepiness while breastfeeding, offer both breasts,and will not limit feedings.  Mother agrees to utilize breast massage while nursing to facilitate lactogenesis.

## 2025-05-10 VITALS
SYSTOLIC BLOOD PRESSURE: 123 MMHG | DIASTOLIC BLOOD PRESSURE: 79 MMHG | TEMPERATURE: 97.9 F | RESPIRATION RATE: 16 BRPM | HEART RATE: 86 BPM | OXYGEN SATURATION: 97 %

## 2025-05-10 PROCEDURE — 94760 N-INVAS EAR/PLS OXIMETRY 1: CPT

## 2025-05-10 PROCEDURE — 6370000000 HC RX 637 (ALT 250 FOR IP): Performed by: OBSTETRICS & GYNECOLOGY

## 2025-05-10 PROCEDURE — 6370000000 HC RX 637 (ALT 250 FOR IP): Performed by: STUDENT IN AN ORGANIZED HEALTH CARE EDUCATION/TRAINING PROGRAM

## 2025-05-10 RX ORDER — OXYCODONE HYDROCHLORIDE 5 MG/1
5 TABLET ORAL EVERY 6 HOURS PRN
Qty: 10 TABLET | Refills: 0 | Status: SHIPPED | OUTPATIENT
Start: 2025-05-10 | End: 2025-05-13

## 2025-05-10 RX ORDER — IBUPROFEN 800 MG/1
800 TABLET, FILM COATED ORAL EVERY 8 HOURS SCHEDULED
Qty: 30 TABLET | Refills: 1 | Status: SHIPPED | OUTPATIENT
Start: 2025-05-10

## 2025-05-10 RX ADMIN — IBUPROFEN 800 MG: 400 TABLET, FILM COATED ORAL at 06:23

## 2025-05-10 RX ADMIN — ACETAMINOPHEN 1000 MG: 500 TABLET ORAL at 06:23

## 2025-05-10 RX ADMIN — IBUPROFEN 800 MG: 400 TABLET, FILM COATED ORAL at 15:00

## 2025-05-10 RX ADMIN — DOCUSATE SODIUM 100 MG: 100 CAPSULE, LIQUID FILLED ORAL at 15:00

## 2025-05-10 RX ADMIN — OXYCODONE HYDROCHLORIDE 5 MG: 5 TABLET ORAL at 02:45

## 2025-05-10 RX ADMIN — ACETAMINOPHEN 1000 MG: 500 TABLET ORAL at 14:59

## 2025-05-10 RX ADMIN — DOCUSATE SODIUM 100 MG: 100 CAPSULE, LIQUID FILLED ORAL at 02:14

## 2025-05-10 ASSESSMENT — PAIN SCALES - GENERAL: PAINLEVEL_OUTOF10: 6

## 2025-05-10 ASSESSMENT — PAIN DESCRIPTION - LOCATION: LOCATION: BACK

## 2025-05-10 ASSESSMENT — PAIN DESCRIPTION - ORIENTATION: ORIENTATION: LOWER

## 2025-05-10 ASSESSMENT — PAIN DESCRIPTION - DESCRIPTORS: DESCRIPTORS: ACHING

## 2025-05-10 NOTE — DISCHARGE INSTRUCTIONS
Postpartum Support Groups  We know that all of us are dealing with a tremendous amount of uncertainty, confusion and disruption to our daily lives, which may result in increased anxiety, depression and fear. If you are feeling unsettled or worse, please know that we are here to help. During this time of increased caution and care for one another, [facility] [place] ([facility]) is offering virtual support groups to ALL MOTHERS in [place] regardless of the age of your child/children as a way to help weather this emotional storm together. Social support is an important part of self-care during this time of physical distancing.  Virtual postpartum support group meetings available at www.postpartumva.org  Warm Line: 556.355.8629    Breastfeeding Support Groups   1st and 3rd Wednesday of each month at [facility] from 11a-12 2nd and 4th Tuesday of each month at [facility] from 10-11a      [email or web address]         Vaginal Childbirth: Care Instructions  Overview     Vaginal birth means delivering a baby through the birth canal (vagina). During labor, the uterus tightens (contracts) regularly to thin and open the cervix and to push the baby out through the birth canal.  Your body will slowly heal in the next few weeks. It's easy to get too tired and overwhelmed during the first weeks after your baby is born. Changes in your hormones can shift your mood without warning. You may find it hard to meet the extra demands on your energy and time. Take it easy on yourself.  Follow-up care is a key part of your treatment and safety. Be sure to make and go to all appointments, and call your doctor if you are having problems. It's also a good idea to know your test results and keep a list of the medicines you take.  How can you care for yourself at home?  Vaginal bleeding and cramps  After delivery, you will have a bloody discharge from your vagina. This will turn pink within a week and

## 2025-05-10 NOTE — PROGRESS NOTES
Post-Partum Day Number 2 Progress Note    Josselin Santizo     Assessment: Doing well, post partum day 2    Plan:   - Discharge home today  - GDM -- will need 2hr GTT PP  - Follow up in office in 6 week(s) with [facility].  - Pain medication prescription(s) sent.  - Questions answered.    Information for the patient's :  Rita Santizo [[id number]]   Vaginal, Spontaneous Patient doing well without significant complaint.  Voiding without difficulty, normal lochia. Ready for discharge home.    Vitals:  /79   Pulse 86   Temp 97.9 °F (36.6 °C) (Oral)   Resp 16   SpO2 97%   Temp (24hrs), Av.8 °F (36.6 °C), Min:97.5 °F (36.4 °C), Max:97.9 °F (36.6 °C)      Exam:        Patient without distress.                Fundus firm, nontender per nursing fundal checks                Perineum with normal lochia noted per nursing assessment                Lower extremities are negative for pathological edema    Labs:     Lab Results   Component Value Date/Time    WBC 15.2 2025 04:43 AM    WBC 18.6 2024 12:24 PM    HGB 14.7 2025 04:43 AM    HGB 12.2 2024 12:24 PM    HCT 42.7 2025 04:43 AM    HCT 35.7 2024 12:24 PM     2025 04:43 AM     2024 12:24 PM       No results found for this or any previous visit (from the past 24 hours).

## 2025-05-10 NOTE — LACTATION NOTE
This note was copied from a baby's chart.  Mom and baby scheduled for discharge today. Mom states the baby has been latching and nursing well but she is concerned that baby is not getting enough to each. She said baby was cluster feeding during the night.    I spoke with mom at length this afternoon about breast feeding behaviors of a . I watched mom latch the baby and gave some tips on getting baby positioned closer and latched deeper. Baby was sucking rhythmically with swallows noted.     We reviewed cluster feeding, engorgement and pumping. Breast feeding teaching completed and all questions answered.

## 2025-05-10 NOTE — DISCHARGE SUMMARY
Obstetrical Discharge Summary     Name: Josselin Santizo MRN: [id number]  SSN: [id number]    YOB: 1995  Age: 29 y.o.  Sex: female      Admit Date: 2025    Discharge Date: 5/10/2025     Admitting Physician: Salima Leong MD     Attending Physician:  Shanda Covington MD     Admission Diagnoses: Full-term PROM with onset of labor within 24 hours of rupture [O42.02]    Discharge Diagnoses:   Information for the patient's :  Rita Santizo [[id number]]   @[id number]@     Additional Diagnoses:  No components found for: \"OBEXTABORH\", \"OBEXTABSCRN\", \"OBEXTRUBELLA\", \"OBEXTGRBS\"    Hospital Course: Normal hospital course following the delivery.    Patient Instructions:   Current Discharge Medication List        START taking these medications    Details   oxyCODONE (ROXICODONE) 5 MG immediate release tablet Take 1 tablet by mouth every 6 hours as needed for Pain for up to 3 days. Max Daily Amount: 20 mg  Qty: 10 tablet, Refills: 0    Comments: Reduce doses taken as pain becomes manageable  Associated Diagnoses: Full-term PROM with onset of labor within 24 hours of rupture      ibuprofen (ADVIL;MOTRIN) 800 MG tablet Take 1 tablet by mouth every 8 hours  Qty: 30 tablet, Refills: 1           CONTINUE these medications which have NOT CHANGED    Details   Prenatal MV-Min-Fe Fum-FA-DHA (PRENATAL 1 PO) Take by mouth           STOP taking these medications       clindamycin 1 % gel Comments:   Reason for Stopping:               Disposition at Discharge: Home or self care    Condition at Discharge: Stable    Reference my discharge instructions.    No follow-ups on file.     Signed By:  Faraz Bermudez MD     May 10, 2025